# Patient Record
Sex: FEMALE | Race: WHITE | NOT HISPANIC OR LATINO | URBAN - METROPOLITAN AREA
[De-identification: names, ages, dates, MRNs, and addresses within clinical notes are randomized per-mention and may not be internally consistent; named-entity substitution may affect disease eponyms.]

---

## 2017-06-13 ENCOUNTER — OUTPATIENT (OUTPATIENT)
Dept: OUTPATIENT SERVICES | Facility: HOSPITAL | Age: 62
LOS: 1 days | End: 2017-06-13
Payer: COMMERCIAL

## 2017-06-13 VITALS
TEMPERATURE: 98 F | OXYGEN SATURATION: 98 % | DIASTOLIC BLOOD PRESSURE: 80 MMHG | SYSTOLIC BLOOD PRESSURE: 122 MMHG | HEART RATE: 60 BPM | HEIGHT: 65 IN | RESPIRATION RATE: 14 BRPM | WEIGHT: 174.17 LBS

## 2017-06-13 DIAGNOSIS — Z01.818 ENCOUNTER FOR OTHER PREPROCEDURAL EXAMINATION: ICD-10-CM

## 2017-06-13 DIAGNOSIS — Z98.890 OTHER SPECIFIED POSTPROCEDURAL STATES: Chronic | ICD-10-CM

## 2017-06-13 DIAGNOSIS — E89.0 POSTPROCEDURAL HYPOTHYROIDISM: Chronic | ICD-10-CM

## 2017-06-13 DIAGNOSIS — M19.90 UNSPECIFIED OSTEOARTHRITIS, UNSPECIFIED SITE: ICD-10-CM

## 2017-06-13 DIAGNOSIS — M16.10 UNILATERAL PRIMARY OSTEOARTHRITIS, UNSPECIFIED HIP: ICD-10-CM

## 2017-06-13 LAB
ALBUMIN SERPL ELPH-MCNC: 4.2 G/DL — SIGNIFICANT CHANGE UP (ref 3.3–5)
ALP SERPL-CCNC: 103 U/L — SIGNIFICANT CHANGE UP (ref 30–120)
ALT FLD-CCNC: 28 U/L DA — SIGNIFICANT CHANGE UP (ref 10–60)
ANION GAP SERPL CALC-SCNC: 5 MMOL/L — SIGNIFICANT CHANGE UP (ref 5–17)
APTT BLD: 30.9 SEC — SIGNIFICANT CHANGE UP (ref 27.5–37.4)
AST SERPL-CCNC: 23 U/L — SIGNIFICANT CHANGE UP (ref 10–40)
BILIRUB SERPL-MCNC: 0.5 MG/DL — SIGNIFICANT CHANGE UP (ref 0.2–1.2)
BLD GP AB SCN SERPL QL: SIGNIFICANT CHANGE UP
BUN SERPL-MCNC: 16 MG/DL — SIGNIFICANT CHANGE UP (ref 7–23)
CALCIUM SERPL-MCNC: 9.2 MG/DL — SIGNIFICANT CHANGE UP (ref 8.4–10.5)
CHLORIDE SERPL-SCNC: 105 MMOL/L — SIGNIFICANT CHANGE UP (ref 96–108)
CO2 SERPL-SCNC: 31 MMOL/L — SIGNIFICANT CHANGE UP (ref 22–31)
CREAT SERPL-MCNC: 0.6 MG/DL — SIGNIFICANT CHANGE UP (ref 0.5–1.3)
GLUCOSE SERPL-MCNC: 112 MG/DL — HIGH (ref 70–99)
HCT VFR BLD CALC: 43.5 % — SIGNIFICANT CHANGE UP (ref 34.5–45)
HGB BLD-MCNC: 14.1 G/DL — SIGNIFICANT CHANGE UP (ref 11.5–15.5)
INR BLD: 0.99 RATIO — SIGNIFICANT CHANGE UP (ref 0.88–1.16)
MCHC RBC-ENTMCNC: 28.2 PG — SIGNIFICANT CHANGE UP (ref 27–34)
MCHC RBC-ENTMCNC: 32.5 GM/DL — SIGNIFICANT CHANGE UP (ref 32–36)
MCV RBC AUTO: 86.7 FL — SIGNIFICANT CHANGE UP (ref 80–100)
MRSA PCR RESULT.: SIGNIFICANT CHANGE UP
PLATELET # BLD AUTO: 239 K/UL — SIGNIFICANT CHANGE UP (ref 150–400)
POTASSIUM SERPL-MCNC: 4.3 MMOL/L — SIGNIFICANT CHANGE UP (ref 3.5–5.3)
POTASSIUM SERPL-SCNC: 4.3 MMOL/L — SIGNIFICANT CHANGE UP (ref 3.5–5.3)
PROT SERPL-MCNC: 7.7 G/DL — SIGNIFICANT CHANGE UP (ref 6–8.3)
PROTHROM AB SERPL-ACNC: 10.8 SEC — SIGNIFICANT CHANGE UP (ref 9.8–12.7)
RBC # BLD: 5.02 M/UL — SIGNIFICANT CHANGE UP (ref 3.8–5.2)
RBC # FLD: 12.2 % — SIGNIFICANT CHANGE UP (ref 10.3–14.5)
S AUREUS DNA NOSE QL NAA+PROBE: DETECTED
SODIUM SERPL-SCNC: 141 MMOL/L — SIGNIFICANT CHANGE UP (ref 135–145)
WBC # BLD: 7.9 K/UL — SIGNIFICANT CHANGE UP (ref 3.8–10.5)
WBC # FLD AUTO: 7.9 K/UL — SIGNIFICANT CHANGE UP (ref 3.8–10.5)

## 2017-06-13 PROCEDURE — 93010 ELECTROCARDIOGRAM REPORT: CPT | Mod: NC

## 2017-06-13 NOTE — H&P PST ADULT - FAMILY HISTORY
Mother  Still living? No  Family history of lung cancer, Age at diagnosis: Age Unknown     Father  Still living? No  Family history of prostate cancer, Age at diagnosis: Age Unknown     Child  Still living? Yes, Estimated age: 21-30  Family history of type 1 diabetes mellitus, Age at diagnosis: Age Unknown

## 2017-06-13 NOTE — H&P PST ADULT - PSYCHIATRIC
negative Affect and characteristics of appearance, verbalizations, behaviors are appropriate not examined

## 2017-06-13 NOTE — H&P PST ADULT - NSANTHOSAYNRD_GEN_A_CORE
No. GIANNA screening performed.  STOP BANG Legend: 0-2 = LOW Risk; 3-4 = INTERMEDIATE Risk; 5-8 = HIGH Risk

## 2017-06-13 NOTE — H&P PST ADULT - PSH
H/O lithotripsy  1997  H/O varicose vein stripping  2012  S/P LASIK surgery  2004  S/P ovarian cystectomy  right ovary 1989  S/P thyroidectomy  partial Right ?  Status post cystoscopy  h/o bladder cancer at age  26 s/p TURBT

## 2017-06-13 NOTE — H&P PST ADULT - HISTORY OF PRESENT ILLNESS
This is a 62 y/o female with right hip pain with radiation to groin and thigh for the past 2 years . she has been experiencing constant achy pain and worse pain 8/10 when getting up from sitting position . scheduled for right anterior total hip replacement This is a 62 y/o female who presents with right hip pain with radiation to groin and thigh for the past 2 years . she has been experiencing constant achy pain and worse pain 8/10 when getting up from sitting position . scheduled for right anterior total hip replacement

## 2017-06-13 NOTE — H&P PST ADULT - PMH
Bladder cancer  at age 26 , surgical intervention  Hypothyroidism  s/p right partial thyroidectomy  Osteoarthritis    Stress incontinence in female

## 2017-06-14 RX ORDER — MUPIROCIN 20 MG/G
1 OINTMENT TOPICAL
Qty: 1 | Refills: 0 | OUTPATIENT
Start: 2017-06-14 | End: 2017-06-19

## 2017-06-14 NOTE — PROGRESS NOTE ADULT - SUBJECTIVE AND OBJECTIVE BOX
Nasal PCR result" staph aureus detected  Topical Mupirocin escribed  Left message for patient to call back to verify treatment Nasal PCR result" staph aureus detected  Topical Mupirocin escribed  Left message for patient to call back to verify treatment  *addendum: patient called back to verify treatment

## 2017-06-19 NOTE — PROVIDER CONTACT NOTE (OTHER) - ACTION/TREATMENT ORDERED:
spoke to pt. who states using Mupirocin. Verbalized understanding to use 2x/day for a total of 5 days. Reminded to bring checklist to hospital on day of surgery

## 2017-06-21 PROCEDURE — 86900 BLOOD TYPING SEROLOGIC ABO: CPT

## 2017-06-21 PROCEDURE — 87641 MR-STAPH DNA AMP PROBE: CPT

## 2017-06-21 PROCEDURE — 36415 COLL VENOUS BLD VENIPUNCTURE: CPT

## 2017-06-21 PROCEDURE — 93005 ELECTROCARDIOGRAM TRACING: CPT

## 2017-06-21 PROCEDURE — 87640 STAPH A DNA AMP PROBE: CPT

## 2017-06-21 PROCEDURE — 85730 THROMBOPLASTIN TIME PARTIAL: CPT

## 2017-06-21 PROCEDURE — 86901 BLOOD TYPING SEROLOGIC RH(D): CPT

## 2017-06-21 PROCEDURE — 86920 COMPATIBILITY TEST SPIN: CPT

## 2017-06-21 PROCEDURE — 85610 PROTHROMBIN TIME: CPT

## 2017-06-21 PROCEDURE — 86850 RBC ANTIBODY SCREEN: CPT

## 2017-06-21 PROCEDURE — 80053 COMPREHEN METABOLIC PANEL: CPT

## 2017-06-21 PROCEDURE — 85027 COMPLETE CBC AUTOMATED: CPT

## 2017-06-21 PROCEDURE — G0463: CPT

## 2017-06-21 RX ORDER — SENNA PLUS 8.6 MG/1
2 TABLET ORAL AT BEDTIME
Qty: 0 | Refills: 0 | Status: DISCONTINUED | OUTPATIENT
Start: 2017-06-22 | End: 2017-06-25

## 2017-06-21 RX ORDER — PANTOPRAZOLE SODIUM 20 MG/1
40 TABLET, DELAYED RELEASE ORAL
Qty: 0 | Refills: 0 | Status: DISCONTINUED | OUTPATIENT
Start: 2017-06-22 | End: 2017-06-25

## 2017-06-21 RX ORDER — MAGNESIUM HYDROXIDE 400 MG/1
30 TABLET, CHEWABLE ORAL DAILY
Qty: 0 | Refills: 0 | Status: DISCONTINUED | OUTPATIENT
Start: 2017-06-22 | End: 2017-06-25

## 2017-06-21 RX ORDER — ONDANSETRON 8 MG/1
4 TABLET, FILM COATED ORAL EVERY 6 HOURS
Qty: 0 | Refills: 0 | Status: DISCONTINUED | OUTPATIENT
Start: 2017-06-22 | End: 2017-06-25

## 2017-06-21 RX ORDER — DOCUSATE SODIUM 100 MG
100 CAPSULE ORAL THREE TIMES A DAY
Qty: 0 | Refills: 0 | Status: DISCONTINUED | OUTPATIENT
Start: 2017-06-22 | End: 2017-06-25

## 2017-06-21 RX ORDER — POLYETHYLENE GLYCOL 3350 17 G/17G
17 POWDER, FOR SOLUTION ORAL DAILY
Qty: 0 | Refills: 0 | Status: DISCONTINUED | OUTPATIENT
Start: 2017-06-22 | End: 2017-06-25

## 2017-06-21 RX ORDER — SODIUM CHLORIDE 9 MG/ML
1000 INJECTION, SOLUTION INTRAVENOUS
Qty: 0 | Refills: 0 | Status: DISCONTINUED | OUTPATIENT
Start: 2017-06-22 | End: 2017-06-23

## 2017-06-22 ENCOUNTER — TRANSCRIPTION ENCOUNTER (OUTPATIENT)
Age: 62
End: 2017-06-22

## 2017-06-22 ENCOUNTER — INPATIENT (INPATIENT)
Facility: HOSPITAL | Age: 62
LOS: 2 days | Discharge: HOME | DRG: 470 | End: 2017-06-25
Attending: ORTHOPAEDIC SURGERY | Admitting: ORTHOPAEDIC SURGERY
Payer: COMMERCIAL

## 2017-06-22 VITALS
HEART RATE: 58 BPM | OXYGEN SATURATION: 96 % | DIASTOLIC BLOOD PRESSURE: 64 MMHG | WEIGHT: 176.37 LBS | RESPIRATION RATE: 17 BRPM | SYSTOLIC BLOOD PRESSURE: 114 MMHG | TEMPERATURE: 98 F | HEIGHT: 65.5 IN

## 2017-06-22 DIAGNOSIS — E89.0 POSTPROCEDURAL HYPOTHYROIDISM: Chronic | ICD-10-CM

## 2017-06-22 DIAGNOSIS — Z98.890 OTHER SPECIFIED POSTPROCEDURAL STATES: Chronic | ICD-10-CM

## 2017-06-22 DIAGNOSIS — M19.90 UNSPECIFIED OSTEOARTHRITIS, UNSPECIFIED SITE: ICD-10-CM

## 2017-06-22 DIAGNOSIS — M16.10 UNILATERAL PRIMARY OSTEOARTHRITIS, UNSPECIFIED HIP: ICD-10-CM

## 2017-06-22 DIAGNOSIS — F10.10 ALCOHOL ABUSE, UNCOMPLICATED: ICD-10-CM

## 2017-06-22 DIAGNOSIS — N39.3 STRESS INCONTINENCE (FEMALE) (MALE): ICD-10-CM

## 2017-06-22 DIAGNOSIS — Z47.1 AFTERCARE FOLLOWING JOINT REPLACEMENT SURGERY: ICD-10-CM

## 2017-06-22 DIAGNOSIS — E78.5 HYPERLIPIDEMIA, UNSPECIFIED: ICD-10-CM

## 2017-06-22 DIAGNOSIS — E03.9 HYPOTHYROIDISM, UNSPECIFIED: ICD-10-CM

## 2017-06-22 DIAGNOSIS — C67.9 MALIGNANT NEOPLASM OF BLADDER, UNSPECIFIED: ICD-10-CM

## 2017-06-22 DIAGNOSIS — M16.11 UNILATERAL PRIMARY OSTEOARTHRITIS, RIGHT HIP: ICD-10-CM

## 2017-06-22 LAB
ANION GAP SERPL CALC-SCNC: 7 MMOL/L — SIGNIFICANT CHANGE UP (ref 5–17)
BUN SERPL-MCNC: 12 MG/DL — SIGNIFICANT CHANGE UP (ref 7–23)
CALCIUM SERPL-MCNC: 8.4 MG/DL — SIGNIFICANT CHANGE UP (ref 8.4–10.5)
CHLORIDE SERPL-SCNC: 108 MMOL/L — SIGNIFICANT CHANGE UP (ref 96–108)
CO2 SERPL-SCNC: 28 MMOL/L — SIGNIFICANT CHANGE UP (ref 22–31)
CREAT SERPL-MCNC: 0.51 MG/DL — SIGNIFICANT CHANGE UP (ref 0.5–1.3)
GLUCOSE SERPL-MCNC: 146 MG/DL — HIGH (ref 70–99)
HCT VFR BLD CALC: 34 % — LOW (ref 34.5–45)
HGB BLD-MCNC: 11.7 G/DL — SIGNIFICANT CHANGE UP (ref 11.5–15.5)
POTASSIUM SERPL-MCNC: 3.7 MMOL/L — SIGNIFICANT CHANGE UP (ref 3.5–5.3)
POTASSIUM SERPL-SCNC: 3.7 MMOL/L — SIGNIFICANT CHANGE UP (ref 3.5–5.3)
SODIUM SERPL-SCNC: 143 MMOL/L — SIGNIFICANT CHANGE UP (ref 135–145)

## 2017-06-22 PROCEDURE — 88305 TISSUE EXAM BY PATHOLOGIST: CPT | Mod: 26

## 2017-06-22 PROCEDURE — 27130 TOTAL HIP ARTHROPLASTY: CPT | Mod: AS,RT

## 2017-06-22 PROCEDURE — 99223 1ST HOSP IP/OBS HIGH 75: CPT | Mod: 25

## 2017-06-22 PROCEDURE — 88311 DECALCIFY TISSUE: CPT | Mod: 26

## 2017-06-22 RX ORDER — VANCOMYCIN HCL 1 G
1000 VIAL (EA) INTRAVENOUS ONCE
Qty: 0 | Refills: 0 | Status: COMPLETED | OUTPATIENT
Start: 2017-06-22 | End: 2017-06-22

## 2017-06-22 RX ORDER — TRANEXAMIC ACID 100 MG/ML
1000 INJECTION, SOLUTION INTRAVENOUS ONCE
Qty: 0 | Refills: 0 | Status: COMPLETED | OUTPATIENT
Start: 2017-06-22 | End: 2017-06-22

## 2017-06-22 RX ORDER — APREPITANT 80 MG/1
40 CAPSULE ORAL ONCE
Qty: 0 | Refills: 0 | Status: COMPLETED | OUTPATIENT
Start: 2017-06-22 | End: 2017-06-22

## 2017-06-22 RX ORDER — CELECOXIB 200 MG/1
1 CAPSULE ORAL
Qty: 38 | Refills: 0 | OUTPATIENT
Start: 2017-06-22 | End: 2017-07-11

## 2017-06-22 RX ORDER — LEVOTHYROXINE SODIUM 125 MCG
100 TABLET ORAL DAILY
Qty: 0 | Refills: 0 | Status: DISCONTINUED | OUTPATIENT
Start: 2017-06-23 | End: 2017-06-25

## 2017-06-22 RX ORDER — ACETAMINOPHEN 500 MG
1000 TABLET ORAL EVERY 6 HOURS
Qty: 0 | Refills: 0 | Status: COMPLETED | OUTPATIENT
Start: 2017-06-22 | End: 2017-06-23

## 2017-06-22 RX ORDER — OMEPRAZOLE 10 MG/1
1 CAPSULE, DELAYED RELEASE ORAL
Qty: 0 | Refills: 0 | COMMUNITY

## 2017-06-22 RX ORDER — ACETAMINOPHEN 500 MG
1000 TABLET ORAL EVERY 8 HOURS
Qty: 0 | Refills: 0 | Status: DISCONTINUED | OUTPATIENT
Start: 2017-06-23 | End: 2017-06-25

## 2017-06-22 RX ORDER — ATORVASTATIN CALCIUM 80 MG/1
40 TABLET, FILM COATED ORAL DAILY
Qty: 0 | Refills: 0 | Status: DISCONTINUED | OUTPATIENT
Start: 2017-06-23 | End: 2017-06-25

## 2017-06-22 RX ORDER — OXYBUTYNIN CHLORIDE 5 MG
5 TABLET ORAL
Qty: 0 | Refills: 0 | Status: DISCONTINUED | OUTPATIENT
Start: 2017-06-22 | End: 2017-06-25

## 2017-06-22 RX ORDER — OXYCODONE HYDROCHLORIDE 5 MG/1
10 TABLET ORAL
Qty: 0 | Refills: 0 | Status: DISCONTINUED | OUTPATIENT
Start: 2017-06-22 | End: 2017-06-23

## 2017-06-22 RX ORDER — ACETAMINOPHEN 500 MG
1000 TABLET ORAL ONCE
Qty: 0 | Refills: 0 | Status: COMPLETED | OUTPATIENT
Start: 2017-06-22 | End: 2017-06-22

## 2017-06-22 RX ORDER — OXYCODONE HYDROCHLORIDE 5 MG/1
5 TABLET ORAL
Qty: 0 | Refills: 0 | Status: DISCONTINUED | OUTPATIENT
Start: 2017-06-22 | End: 2017-06-23

## 2017-06-22 RX ORDER — CELECOXIB 200 MG/1
200 CAPSULE ORAL ONCE
Qty: 0 | Refills: 0 | Status: COMPLETED | OUTPATIENT
Start: 2017-06-22 | End: 2017-06-22

## 2017-06-22 RX ORDER — ASPIRIN/CALCIUM CARB/MAGNESIUM 324 MG
162 TABLET ORAL EVERY 12 HOURS
Qty: 0 | Refills: 0 | Status: DISCONTINUED | OUTPATIENT
Start: 2017-06-23 | End: 2017-06-25

## 2017-06-22 RX ORDER — HYDROMORPHONE HYDROCHLORIDE 2 MG/ML
0.5 INJECTION INTRAMUSCULAR; INTRAVENOUS; SUBCUTANEOUS
Qty: 0 | Refills: 0 | Status: DISCONTINUED | OUTPATIENT
Start: 2017-06-22 | End: 2017-06-23

## 2017-06-22 RX ORDER — HYDROMORPHONE HYDROCHLORIDE 2 MG/ML
0.5 INJECTION INTRAMUSCULAR; INTRAVENOUS; SUBCUTANEOUS
Qty: 0 | Refills: 0 | Status: DISCONTINUED | OUTPATIENT
Start: 2017-06-22 | End: 2017-06-22

## 2017-06-22 RX ORDER — SODIUM CHLORIDE 9 MG/ML
1000 INJECTION, SOLUTION INTRAVENOUS
Qty: 0 | Refills: 0 | Status: DISCONTINUED | OUTPATIENT
Start: 2017-06-22 | End: 2017-06-22

## 2017-06-22 RX ORDER — VANCOMYCIN HCL 1 G
1250 VIAL (EA) INTRAVENOUS ONCE
Qty: 0 | Refills: 0 | Status: COMPLETED | OUTPATIENT
Start: 2017-06-22 | End: 2017-06-22

## 2017-06-22 RX ORDER — CELECOXIB 200 MG/1
200 CAPSULE ORAL
Qty: 0 | Refills: 0 | Status: DISCONTINUED | OUTPATIENT
Start: 2017-06-22 | End: 2017-06-25

## 2017-06-22 RX ADMIN — Medication 166.67 MILLIGRAM(S): at 19:49

## 2017-06-22 RX ADMIN — Medication 400 MILLIGRAM(S): at 16:19

## 2017-06-22 RX ADMIN — APREPITANT 40 MILLIGRAM(S): 80 CAPSULE ORAL at 07:55

## 2017-06-22 RX ADMIN — Medication 1000 MILLIGRAM(S): at 22:00

## 2017-06-22 RX ADMIN — CELECOXIB 200 MILLIGRAM(S): 200 CAPSULE ORAL at 07:56

## 2017-06-22 RX ADMIN — HYDROMORPHONE HYDROCHLORIDE 0.5 MILLIGRAM(S): 2 INJECTION INTRAMUSCULAR; INTRAVENOUS; SUBCUTANEOUS at 21:36

## 2017-06-22 RX ADMIN — SODIUM CHLORIDE 100 MILLILITER(S): 9 INJECTION, SOLUTION INTRAVENOUS at 13:21

## 2017-06-22 RX ADMIN — Medication 1000 MILLIGRAM(S): at 18:25

## 2017-06-22 RX ADMIN — HYDROMORPHONE HYDROCHLORIDE 0.5 MILLIGRAM(S): 2 INJECTION INTRAMUSCULAR; INTRAVENOUS; SUBCUTANEOUS at 21:45

## 2017-06-22 RX ADMIN — Medication 200 MILLIGRAM(S): at 18:19

## 2017-06-22 RX ADMIN — HYDROMORPHONE HYDROCHLORIDE 0.5 MILLIGRAM(S): 2 INJECTION INTRAMUSCULAR; INTRAVENOUS; SUBCUTANEOUS at 16:46

## 2017-06-22 RX ADMIN — HYDROMORPHONE HYDROCHLORIDE 0.5 MILLIGRAM(S): 2 INJECTION INTRAMUSCULAR; INTRAVENOUS; SUBCUTANEOUS at 13:39

## 2017-06-22 RX ADMIN — Medication 100 MILLIGRAM(S): at 21:26

## 2017-06-22 RX ADMIN — SENNA PLUS 2 TABLET(S): 8.6 TABLET ORAL at 21:26

## 2017-06-22 RX ADMIN — ONDANSETRON 4 MILLIGRAM(S): 8 TABLET, FILM COATED ORAL at 13:21

## 2017-06-22 RX ADMIN — Medication 400 MILLIGRAM(S): at 21:26

## 2017-06-22 RX ADMIN — HYDROMORPHONE HYDROCHLORIDE 0.5 MILLIGRAM(S): 2 INJECTION INTRAMUSCULAR; INTRAVENOUS; SUBCUTANEOUS at 13:21

## 2017-06-22 RX ADMIN — HYDROMORPHONE HYDROCHLORIDE 0.5 MILLIGRAM(S): 2 INJECTION INTRAMUSCULAR; INTRAVENOUS; SUBCUTANEOUS at 16:29

## 2017-06-22 NOTE — CONSULT NOTE ADULT - SUBJECTIVE AND OBJECTIVE BOX
Patient is a 61y old  Female who presents with a chief complaint of Right hip pain (2017 12:20)      HPI:    PAST MEDICAL & SURGICAL HISTORY:  Stress incontinence in female  Hypothyroidism: s/p right partial thyroidectomy  Osteoarthritis  Bladder cancer: at age 26 , surgical intervention  S/P ovarian cystectomy: right ovary   Status post cystoscopy: h/o bladder cancer at age  26 s/p TURBT  S/P LASIK surgery:   H/O varicose vein strippin  H/O lithotripsy:   S/P thyroidectomy: partial Right ?      REVIEW OF SYSTEMS:    CONSTITUTIONAL: No fever, weight loss, or fatigue  EYES: No eye pain, visual disturbances, or discharge  ENMT:  No difficulty hearing, tinnitus, vertigo; No sinus or throat pain  NECK: No pain or stiffness  BREASTS: No pain, masses, or nipple discharge  RESPIRATORY: No cough, wheezing, chills or hemoptysis; No shortness of breath  CARDIOVASCULAR: No chest pain, palpitations, dizziness, or leg swelling  GASTROINTESTINAL: No abdominal or epigastric pain. No nausea, vomiting, or hematemesis; No diarrhea or constipation. No melena or hematochezia.  GENITOURINARY: No dysuria, frequency, hematuria, or incontinence  NEUROLOGICAL: No headaches, memory loss, loss of strength, numbness, or tremors  SKIN: No itching, burning, rashes, or lesions   LYMPH NODES: No enlarged glands  ENDOCRINE: No heat or cold intolerance; No hair loss  MUSCULOSKELETAL: No muscle or back pain  PSYCHIATRIC: No depression, anxiety, mood swings, or difficulty sleeping  HEME/LYMPH: No easy bruising, or bleeding gums  ALLERGY AND IMMUNOLOGIC: No hives or eczema      MEDICATIONS  (STANDING):  lactated ringers. 1000milliLiter(s) IV Continuous <Continuous>  pantoprazole    Tablet 40milliGRAM(s) Oral before breakfast  senna 2Tablet(s) Oral at bedtime  docusate sodium 100milliGRAM(s) Oral three times a day  vancomycin  IVPB 1250milliGRAM(s) IV Intermittent once  celecoxib 200milliGRAM(s) Oral two times a day after meals  oxybutynin 5milliGRAM(s) Oral two times a day  acetaminophen  IVPB. 1000milliGRAM(s) IV Intermittent every 6 hours    MEDICATIONS  (PRN):  aluminum hydroxide/magnesium hydroxide/simethicone Suspension 30milliLiter(s) Oral four times a day PRN Indigestion  ondansetron Injectable 4milliGRAM(s) IV Push every 6 hours PRN Nausea and/or Vomiting  polyethylene glycol 3350 17Gram(s) Oral daily PRN Constipation  magnesium hydroxide Suspension 30milliLiter(s) Oral daily PRN Constipation  oxyCODONE IR 5milliGRAM(s) Oral every 3 hours PRN Mild Pain  oxyCODONE IR 10milliGRAM(s) Oral every 3 hours PRN Moderate Pain  HYDROmorphone  Injectable 0.5milliGRAM(s) IV Push every 3 hours PRN Severe Pain      Allergies    bacitracin topical (Other)  penicillins (Other)    Intolerances        SOCIAL HISTORY:  Alochol: Drinks wine 1 glass daily  Smoking: Former smoker  Drug Use: Denied  Marital Status:           FAMILY HISTORY:  Family history of type 1 diabetes mellitus (Child): son  Family history of prostate cancer (Father)  Family history of lung cancer (Mother)      Vital Signs Last 24 Hrs  T(C): 36.3, Max: 36.6 ( @ 07:33)  T(F): 97.3, Max: 97.9 ( @ 07:33)  HR: 64 (58 - 71)  BP: 121/66 (103/62 - 121/66)  BP(mean): --  RR: 16 (14 - 19)  SpO2: 100% (96% - 100%)    PHYSICAL EXAM:    GENERAL: NAD, well-groomed, well-developed  HEAD:  Atraumatic, Normocephalic  EYES: EOMI, PERRLA, conjunctiva and sclera clear  ENMT: No tonsillar erythema, exudates, or enlargement; Moist mucous membranes, Good dentition, No lesions  NECK: Supple, No JVD, Normal thyroid  NERVOUS SYSTEM:  Alert & Oriented X3, Good concentration; Motor Strength 5/5 B/L upper and lower extremities; DTRs 2+ intact and symmetric  CHEST/LUNG: Clear to percussion bilaterally; No rales, rhonchi, wheezing, or rubs  HEART: Regular rate and rhythm; No murmurs, rubs, or gallops  ABDOMEN: Soft, Nontender, Nondistended; Bowel sounds present  EXTREMITIES:  2+ Peripheral Pulses, No clubbing, cyanosis, or edema  LYMPH: No lymphadenopathy notedRECTAL: No masses, prostate normal size and smooth, Guiac negative   BREAST: No palpatble masses, skin no lesions no retractions, no discharages. adenexa no palpable masses noted   GYN: uterus normal size, adenexa no palpable masses, no CMT, no uterine discharge   SKIN: No rashes or lesions  INCISION:     LABS:              CAPILLARY BLOOD GLUCOSE      RADIOLOGY & ADDITIONAL STUDIES: Patient is a 61y old  Female who presents with a chief complaint of Right hip pain (2017 12:20)      HPI: patient has right hip pain for a while and not responding to medical management and affected her daily activity.     PAST MEDICAL & SURGICAL HISTORY:  Stress incontinence in female  Hypothyroidism: s/p right partial thyroidectomy  Osteoarthritis  Bladder cancer: at age 26 , surgical intervention  S/P ovarian cystectomy: right ovary   Status post cystoscopy: h/o bladder cancer at age  26 s/p TURBT  S/P LASIK surgery:   H/O varicose vein strippin  H/O lithotripsy:   S/P thyroidectomy: partial Right ?      REVIEW OF SYSTEMS:    CONSTITUTIONAL: No fever, weight loss, or fatigue  EYES: No eye pain, visual disturbances, or discharge  ENMT:  No difficulty hearing, tinnitus, vertigo; No sinus or throat pain  NECK: No pain or stiffness  BREASTS: No pain, masses, or nipple discharge  RESPIRATORY: No cough, wheezing, chills or hemoptysis; No shortness of breath  CARDIOVASCULAR: No chest pain, palpitations, dizziness, or leg swelling  GASTROINTESTINAL: No abdominal or epigastric pain. No nausea, vomiting, or hematemesis; No diarrhea or constipation. No melena or hematochezia.  GENITOURINARY: No dysuria, frequency, hematuria, or incontinence  NEUROLOGICAL: No headaches, memory loss, loss of strength, numbness, or tremors  SKIN: No itching, burning, rashes, or lesions   LYMPH NODES: No enlarged glands  ENDOCRINE: No heat or cold intolerance; No hair loss  MUSCULOSKELETAL: No muscle or back pain  PSYCHIATRIC: No depression, anxiety, mood swings, or difficulty sleeping  HEME/LYMPH: No easy bruising, or bleeding gums  ALLERGY AND IMMUNOLOGIC: No hives or eczema      MEDICATIONS  (STANDING):  lactated ringers. 1000milliLiter(s) IV Continuous <Continuous>  pantoprazole    Tablet 40milliGRAM(s) Oral before breakfast  senna 2Tablet(s) Oral at bedtime  docusate sodium 100milliGRAM(s) Oral three times a day  vancomycin  IVPB 1250milliGRAM(s) IV Intermittent once  celecoxib 200milliGRAM(s) Oral two times a day after meals  oxybutynin 5milliGRAM(s) Oral two times a day  acetaminophen  IVPB. 1000milliGRAM(s) IV Intermittent every 6 hours    MEDICATIONS  (PRN):  aluminum hydroxide/magnesium hydroxide/simethicone Suspension 30milliLiter(s) Oral four times a day PRN Indigestion  ondansetron Injectable 4milliGRAM(s) IV Push every 6 hours PRN Nausea and/or Vomiting  polyethylene glycol 3350 17Gram(s) Oral daily PRN Constipation  magnesium hydroxide Suspension 30milliLiter(s) Oral daily PRN Constipation  oxyCODONE IR 5milliGRAM(s) Oral every 3 hours PRN Mild Pain  oxyCODONE IR 10milliGRAM(s) Oral every 3 hours PRN Moderate Pain  HYDROmorphone  Injectable 0.5milliGRAM(s) IV Push every 3 hours PRN Severe Pain      Allergies    bacitracin topical (Other)  penicillins (Other)    Intolerances        SOCIAL HISTORY:  Alochol: Drinks wine socially.  Smoking: Former smoker years ago.  Drug Use: Denied  Marital Status:           FAMILY HISTORY:  Family history of type 1 diabetes mellitus (Child): son  Family history of prostate cancer (Father)  Family history of lung cancer (Mother)      Vital Signs Last 24 Hrs  T(C): 36.3, Max: 36.6 (06- @ 07:33)  T(F): 97.3, Max: 97.9 (06-22 @ 07:33)  HR: 64 (58 - 71)  BP: 121/66 (103/62 - 121/66)  BP(mean): --  RR: 16 (14 - 19)  SpO2: 100% (96% - 100%)    PHYSICAL EXAM:    GENERAL: NAD, well-groomed, well-developed  HEAD:  Atraumatic, Normocephalic  EYES: EOMI, PERRLA, conjunctiva and sclera clear  ENMT: No tonsillar erythema, exudates, or enlargement; Moist mucous membranes, Good dentition, No lesions  NECK: Supple, No JVD, Normal thyroid  NERVOUS SYSTEM:  Alert & Oriented X3, Good concentration; Motor Strength 5/5 B/L upper and lower extremities; DTRs 2+ intact and symmetric  CHEST/LUNG: Clear to percussion bilaterally; No rales, rhonchi, wheezing, or rubs  HEART: Regular rate and rhythm; No murmurs, rubs, or gallops  ABDOMEN: Soft, Nontender, Nondistended; Bowel sounds present  EXTREMITIES:  2+ Peripheral Pulses, No clubbing, cyanosis, or edema  LYMPH: No lymphadenopathy notedRECTAL: No masses, prostate normal size and smooth, Guiac negative   SKIN: No rashes or lesions  INCISION:     LABS: pending              CAPILLARY BLOOD GLUCOSE      RADIOLOGY & ADDITIONAL STUDIES:

## 2017-06-22 NOTE — PHYSICAL THERAPY INITIAL EVALUATION ADULT - GAIT DEVIATIONS NOTED, PT EVAL
decreased step length/decreased stride length/decreased velocity of limb motion/decreased weight-shifting ability/decreased og

## 2017-06-22 NOTE — DISCHARGE NOTE ADULT - CARE PLAN
Principal Discharge DX:	Primary osteoarthritis of right hip  Goal:	Improve ambulation, ADLs and quality of life  Instructions for follow-up, activity and diet:	Physical Therapy/Occupational Therapy for: Ambulation, transfers, stairs, & ADLs, isometrics.  Full weight bearing on both legs; Walker/cane use as instructed by Physical therapy/Occupational therapy. Anterior Total Hip Replacement precautions for  6 weeks: No straight leg raise; No external rotation of hip when extended-standing or lying flat; No hyperextension of hip when standing (kickback).   Ice packs to hip for 30 min. every 3 hours and after physical therapy.   Keep incision clean and dry. May shower 5 days after surgery if no drainage from incision.  Prineo tape/suture removal on/near after Post Op Day # 14 in rehab facility / Surgeon's office.

## 2017-06-22 NOTE — OCCUPATIONAL THERAPY INITIAL EVALUATION ADULT - IADL RETRAINING, OT EVAL
Patient will increase standing tolerance to 3-5 minutes for grooming at the sink with in 1-2 sessions.

## 2017-06-22 NOTE — PHYSICAL THERAPY INITIAL EVALUATION ADULT - GAIT TRAINING, PT EVAL
Goals 3-5 days, Pt will ambulate 75 ft w/ rolling walker independently.      Pt will negotiate 10 steps with rail and straight cane with supervision.

## 2017-06-22 NOTE — DISCHARGE NOTE ADULT - CARE PROVIDER_API CALL
JOSEMANUEL Alaniz (MD), Orthopaedic Surgery  [address] [place] [address]  Phone: [phone]  Fax: [phone]

## 2017-06-22 NOTE — DISCHARGE NOTE ADULT - DURABLE MEDICAL EQUIPMENT AGENCY
[facility] Tel.# [phone] for DME for Rolling Walker, 3:1 [employer] [facility] Tel.# [phone] for DME for Rolling Walker, 3:1 [employer] / Hip Kit thru [employer] (out of pocket)

## 2017-06-22 NOTE — PHYSICAL THERAPY INITIAL EVALUATION ADULT - ADDITIONAL COMMENTS
Pt lives with  in a house w/ 4 steps to enter with no rail, 20 steps insides to bedroom with rail.  Pt has no DME

## 2017-06-22 NOTE — DISCHARGE NOTE ADULT - HOSPITAL COURSE
This patient was admitted to [facility] with a history of severe degenerative joint disease of the Right hip.  Patient went to Pre-Surgical Testing at [facility] and was medically cleared to undergo elective procedure. Right THR performed on 6/22/17 by .  No operative or shahriar-operative complications arose during patients hospital course.  Patient received antibiotic according to SCIP guidelines for infection prevention.  Enotrin was given for DVT prophylaxis.  Anesthesia, Medical Hospitalist, Physical Therapy and Occupational Therapy were consulted. Patient is stable for discharge with a good prognosis.  Appropriate discharge instructions and medications are provided in this document.

## 2017-06-22 NOTE — OCCUPATIONAL THERAPY INITIAL EVALUATION ADULT - ADDITIONAL COMMENTS
Lives with spouse. 4 steps to enter no handrail. Flight of 20 inside with handrail. Tub with curtains. No DME

## 2017-06-22 NOTE — DISCHARGE NOTE ADULT - PLAN OF CARE
Improve ambulation, ADLs and quality of life Physical Therapy/Occupational Therapy for: Ambulation, transfers, stairs, & ADLs, isometrics.  Full weight bearing on both legs; Walker/cane use as instructed by Physical therapy/Occupational therapy. Anterior Total Hip Replacement precautions for  6 weeks: No straight leg raise; No external rotation of hip when extended-standing or lying flat; No hyperextension of hip when standing (kickback).   Ice packs to hip for 30 min. every 3 hours and after physical therapy.   Keep incision clean and dry. May shower 5 days after surgery if no drainage from incision.  Prineo tape/suture removal on/near after Post Op Day # 14 in rehab facility / Surgeon's office.

## 2017-06-22 NOTE — DISCHARGE NOTE ADULT - PATIENT PORTAL LINK FT
“You can access the [employer] Patient Portal, offered by [facility], by registering with the following website: [email or web address]”

## 2017-06-22 NOTE — DISCHARGE NOTE ADULT - NS AS ACTIVITY OBS
Showering allowed/Do not make important decisions/Do not drive or operate machinery/No Heavy lifting/straining/Walking-Indoors allowed/Stairs allowed

## 2017-06-22 NOTE — DISCHARGE NOTE ADULT - MEDICATION SUMMARY - MEDICATIONS TO TAKE
I will START or STAY ON the medications listed below when I get home from the hospital:    rolling walker  -- Dx: Right THR  -- Indication: For Home equipment    3:1 commode  -- Dx: Right THR  -- Indication: For Home equipment    Hip kit  -- Dx: Right THR  -- Indication: For Home equipment    celecoxib 200 mg oral capsule  -- 1 cap(s) by mouth 2 times a day (after meals)  -- Indication: For Heterotopic bone prevention    Actamin 500 mg oral tablet  -- 2 tab(s) by mouth every 8 hours for 2 weeks then as needed for mild pain  -- Indication: For Pain    traMADol 50 mg oral tablet  -- 1 tab(s) by mouth every 4 hours, As needed, Pain MDD:6  -- Indication: For Pain    aspirin 81 mg oral delayed release tablet  -- 2 tab(s) by mouth every 12 hours for 6 weeks MDD:4  -- Indication: For Blood clot prevention    aluminum hydroxide-magnesium hydroxide 200 mg-200 mg/5 mL oral suspension  -- 30 milliliter(s) by mouth 4 times a day, As needed, Indigestion  -- Indication: For indigestion    magnesium hydroxide 8% oral suspension  -- 30 milliliter(s) by mouth once a day, As needed, Constipation  -- Indication: For constipation    atorvastatin 40 mg oral tablet  -- 1 tab(s) by mouth once a day  -- Indication: For Home med    docusate sodium 100 mg oral capsule  -- 1 cap(s) by mouth 3 times a day  -- Indication: For constipation    polyethylene glycol 3350 oral powder for reconstitution  -- 17 gram(s) by mouth once a day, As needed, Constipation  -- Indication: For constiaption    senna oral tablet  -- 2 tab(s) by mouth once a day (at bedtime)  -- Indication: For constipation    pantoprazole 40 mg oral delayed release tablet  -- 1 tab(s) by mouth once a day (before a meal)  -- Indication: For Stomach ulcer prevention    levothyroxine 100 mcg (0.1 mg) oral tablet  -- 1 tab(s) by mouth once a day  -- Indication: For Home med    oxybutynin 5 mg/24 hours oral tablet, extended release  --  by mouth once a day  -- Indication: For Home med    multivitamin  --   once a day  -- Indication: For Home med

## 2017-06-23 DIAGNOSIS — R73.9 HYPERGLYCEMIA, UNSPECIFIED: ICD-10-CM

## 2017-06-23 LAB
ALBUMIN SERPL ELPH-MCNC: 3 G/DL — LOW (ref 3.3–5)
ALP SERPL-CCNC: 74 U/L — SIGNIFICANT CHANGE UP (ref 30–120)
ALT FLD-CCNC: 20 U/L DA — SIGNIFICANT CHANGE UP (ref 10–60)
ANION GAP SERPL CALC-SCNC: 9 MMOL/L — SIGNIFICANT CHANGE UP (ref 5–17)
AST SERPL-CCNC: 23 U/L — SIGNIFICANT CHANGE UP (ref 10–40)
BILIRUB SERPL-MCNC: 0.7 MG/DL — SIGNIFICANT CHANGE UP (ref 0.2–1.2)
BUN SERPL-MCNC: 9 MG/DL — SIGNIFICANT CHANGE UP (ref 7–23)
CALCIUM SERPL-MCNC: 8.4 MG/DL — SIGNIFICANT CHANGE UP (ref 8.4–10.5)
CHLORIDE SERPL-SCNC: 105 MMOL/L — SIGNIFICANT CHANGE UP (ref 96–108)
CO2 SERPL-SCNC: 29 MMOL/L — SIGNIFICANT CHANGE UP (ref 22–31)
CREAT SERPL-MCNC: 0.51 MG/DL — SIGNIFICANT CHANGE UP (ref 0.5–1.3)
GLUCOSE SERPL-MCNC: 110 MG/DL — HIGH (ref 70–99)
HCT VFR BLD CALC: 34.8 % — SIGNIFICANT CHANGE UP (ref 34.5–45)
HGB BLD-MCNC: 12.3 G/DL — SIGNIFICANT CHANGE UP (ref 11.5–15.5)
MCHC RBC-ENTMCNC: 30.6 PG — SIGNIFICANT CHANGE UP (ref 27–34)
MCHC RBC-ENTMCNC: 35.2 GM/DL — SIGNIFICANT CHANGE UP (ref 32–36)
MCV RBC AUTO: 86.7 FL — SIGNIFICANT CHANGE UP (ref 80–100)
PLATELET # BLD AUTO: 189 K/UL — SIGNIFICANT CHANGE UP (ref 150–400)
POTASSIUM SERPL-MCNC: 3.9 MMOL/L — SIGNIFICANT CHANGE UP (ref 3.5–5.3)
POTASSIUM SERPL-SCNC: 3.9 MMOL/L — SIGNIFICANT CHANGE UP (ref 3.5–5.3)
PROT SERPL-MCNC: 6 G/DL — SIGNIFICANT CHANGE UP (ref 6–8.3)
RBC # BLD: 4.01 M/UL — SIGNIFICANT CHANGE UP (ref 3.8–5.2)
RBC # FLD: 12.5 % — SIGNIFICANT CHANGE UP (ref 10.3–14.5)
SODIUM SERPL-SCNC: 143 MMOL/L — SIGNIFICANT CHANGE UP (ref 135–145)
WBC # BLD: 9.5 K/UL — SIGNIFICANT CHANGE UP (ref 3.8–10.5)
WBC # FLD AUTO: 9.5 K/UL — SIGNIFICANT CHANGE UP (ref 3.8–10.5)

## 2017-06-23 PROCEDURE — 99233 SBSQ HOSP IP/OBS HIGH 50: CPT

## 2017-06-23 RX ORDER — OXYBUTYNIN CHLORIDE 5 MG
0 TABLET ORAL
Qty: 0 | Refills: 0 | COMMUNITY

## 2017-06-23 RX ORDER — LEVOTHYROXINE SODIUM 125 MCG
1 TABLET ORAL
Qty: 0 | Refills: 0 | COMMUNITY

## 2017-06-23 RX ORDER — ATORVASTATIN CALCIUM 80 MG/1
1 TABLET, FILM COATED ORAL
Qty: 0 | Refills: 0 | COMMUNITY

## 2017-06-23 RX ORDER — TRAMADOL HYDROCHLORIDE 50 MG/1
100 TABLET ORAL EVERY 4 HOURS
Qty: 0 | Refills: 0 | Status: DISCONTINUED | OUTPATIENT
Start: 2017-06-23 | End: 2017-06-25

## 2017-06-23 RX ORDER — TRAMADOL HYDROCHLORIDE 50 MG/1
50 TABLET ORAL EVERY 4 HOURS
Qty: 0 | Refills: 0 | Status: DISCONTINUED | OUTPATIENT
Start: 2017-06-23 | End: 2017-06-25

## 2017-06-23 RX ADMIN — TRAMADOL HYDROCHLORIDE 50 MILLIGRAM(S): 50 TABLET ORAL at 21:35

## 2017-06-23 RX ADMIN — CELECOXIB 200 MILLIGRAM(S): 200 CAPSULE ORAL at 18:47

## 2017-06-23 RX ADMIN — Medication 400 MILLIGRAM(S): at 04:39

## 2017-06-23 RX ADMIN — Medication 1000 MILLIGRAM(S): at 18:47

## 2017-06-23 RX ADMIN — OXYCODONE HYDROCHLORIDE 5 MILLIGRAM(S): 5 TABLET ORAL at 09:00

## 2017-06-23 RX ADMIN — CELECOXIB 200 MILLIGRAM(S): 200 CAPSULE ORAL at 18:46

## 2017-06-23 RX ADMIN — Medication 1000 MILLIGRAM(S): at 11:18

## 2017-06-23 RX ADMIN — Medication 100 MILLIGRAM(S): at 21:03

## 2017-06-23 RX ADMIN — HYDROMORPHONE HYDROCHLORIDE 0.5 MILLIGRAM(S): 2 INJECTION INTRAMUSCULAR; INTRAVENOUS; SUBCUTANEOUS at 01:45

## 2017-06-23 RX ADMIN — ATORVASTATIN CALCIUM 40 MILLIGRAM(S): 80 TABLET, FILM COATED ORAL at 21:03

## 2017-06-23 RX ADMIN — CELECOXIB 200 MILLIGRAM(S): 200 CAPSULE ORAL at 08:19

## 2017-06-23 RX ADMIN — Medication 1000 MILLIGRAM(S): at 18:46

## 2017-06-23 RX ADMIN — Medication 1000 MILLIGRAM(S): at 05:30

## 2017-06-23 RX ADMIN — Medication 162 MILLIGRAM(S): at 08:19

## 2017-06-23 RX ADMIN — HYDROMORPHONE HYDROCHLORIDE 0.5 MILLIGRAM(S): 2 INJECTION INTRAMUSCULAR; INTRAVENOUS; SUBCUTANEOUS at 05:34

## 2017-06-23 RX ADMIN — PANTOPRAZOLE SODIUM 40 MILLIGRAM(S): 20 TABLET, DELAYED RELEASE ORAL at 05:19

## 2017-06-23 RX ADMIN — HYDROMORPHONE HYDROCHLORIDE 0.5 MILLIGRAM(S): 2 INJECTION INTRAMUSCULAR; INTRAVENOUS; SUBCUTANEOUS at 05:19

## 2017-06-23 RX ADMIN — Medication 100 MILLIGRAM(S): at 05:20

## 2017-06-23 RX ADMIN — Medication 100 MICROGRAM(S): at 05:20

## 2017-06-23 RX ADMIN — TRAMADOL HYDROCHLORIDE 50 MILLIGRAM(S): 50 TABLET ORAL at 12:52

## 2017-06-23 RX ADMIN — TRAMADOL HYDROCHLORIDE 50 MILLIGRAM(S): 50 TABLET ORAL at 21:02

## 2017-06-23 RX ADMIN — OXYCODONE HYDROCHLORIDE 5 MILLIGRAM(S): 5 TABLET ORAL at 08:19

## 2017-06-23 RX ADMIN — CELECOXIB 200 MILLIGRAM(S): 200 CAPSULE ORAL at 08:21

## 2017-06-23 RX ADMIN — HYDROMORPHONE HYDROCHLORIDE 0.5 MILLIGRAM(S): 2 INJECTION INTRAMUSCULAR; INTRAVENOUS; SUBCUTANEOUS at 01:33

## 2017-06-23 RX ADMIN — SENNA PLUS 2 TABLET(S): 8.6 TABLET ORAL at 21:02

## 2017-06-23 RX ADMIN — Medication 100 MILLIGRAM(S): at 12:51

## 2017-06-23 RX ADMIN — Medication 5 MILLIGRAM(S): at 05:20

## 2017-06-23 RX ADMIN — Medication 5 MILLIGRAM(S): at 18:46

## 2017-06-23 RX ADMIN — TRAMADOL HYDROCHLORIDE 50 MILLIGRAM(S): 50 TABLET ORAL at 13:30

## 2017-06-23 RX ADMIN — Medication 162 MILLIGRAM(S): at 21:02

## 2017-06-24 LAB
HCT VFR BLD CALC: 33.7 % — LOW (ref 34.5–45)
HGB BLD-MCNC: 11.4 G/DL — LOW (ref 11.5–15.5)
MCHC RBC-ENTMCNC: 29.6 PG — SIGNIFICANT CHANGE UP (ref 27–34)
MCHC RBC-ENTMCNC: 34 GM/DL — SIGNIFICANT CHANGE UP (ref 32–36)
MCV RBC AUTO: 87.3 FL — SIGNIFICANT CHANGE UP (ref 80–100)
PLATELET # BLD AUTO: 176 K/UL — SIGNIFICANT CHANGE UP (ref 150–400)
RBC # BLD: 3.86 M/UL — SIGNIFICANT CHANGE UP (ref 3.8–5.2)
RBC # FLD: 12.4 % — SIGNIFICANT CHANGE UP (ref 10.3–14.5)
WBC # BLD: 8.8 K/UL — SIGNIFICANT CHANGE UP (ref 3.8–10.5)
WBC # FLD AUTO: 8.8 K/UL — SIGNIFICANT CHANGE UP (ref 3.8–10.5)

## 2017-06-24 PROCEDURE — 99233 SBSQ HOSP IP/OBS HIGH 50: CPT

## 2017-06-24 RX ADMIN — CELECOXIB 200 MILLIGRAM(S): 200 CAPSULE ORAL at 17:14

## 2017-06-24 RX ADMIN — Medication 100 MILLIGRAM(S): at 13:32

## 2017-06-24 RX ADMIN — CELECOXIB 200 MILLIGRAM(S): 200 CAPSULE ORAL at 09:07

## 2017-06-24 RX ADMIN — PANTOPRAZOLE SODIUM 40 MILLIGRAM(S): 20 TABLET, DELAYED RELEASE ORAL at 05:55

## 2017-06-24 RX ADMIN — Medication 1000 MILLIGRAM(S): at 09:01

## 2017-06-24 RX ADMIN — TRAMADOL HYDROCHLORIDE 50 MILLIGRAM(S): 50 TABLET ORAL at 09:50

## 2017-06-24 RX ADMIN — Medication 162 MILLIGRAM(S): at 20:56

## 2017-06-24 RX ADMIN — CELECOXIB 200 MILLIGRAM(S): 200 CAPSULE ORAL at 18:08

## 2017-06-24 RX ADMIN — Medication 5 MILLIGRAM(S): at 17:13

## 2017-06-24 RX ADMIN — SENNA PLUS 2 TABLET(S): 8.6 TABLET ORAL at 20:57

## 2017-06-24 RX ADMIN — Medication 100 MICROGRAM(S): at 05:55

## 2017-06-24 RX ADMIN — ATORVASTATIN CALCIUM 40 MILLIGRAM(S): 80 TABLET, FILM COATED ORAL at 20:57

## 2017-06-24 RX ADMIN — TRAMADOL HYDROCHLORIDE 50 MILLIGRAM(S): 50 TABLET ORAL at 21:30

## 2017-06-24 RX ADMIN — Medication 1000 MILLIGRAM(S): at 09:11

## 2017-06-24 RX ADMIN — TRAMADOL HYDROCHLORIDE 50 MILLIGRAM(S): 50 TABLET ORAL at 09:07

## 2017-06-24 RX ADMIN — Medication 162 MILLIGRAM(S): at 09:01

## 2017-06-24 RX ADMIN — CELECOXIB 200 MILLIGRAM(S): 200 CAPSULE ORAL at 09:11

## 2017-06-24 RX ADMIN — TRAMADOL HYDROCHLORIDE 50 MILLIGRAM(S): 50 TABLET ORAL at 14:20

## 2017-06-24 RX ADMIN — Medication 100 MILLIGRAM(S): at 05:55

## 2017-06-24 RX ADMIN — Medication 1000 MILLIGRAM(S): at 17:13

## 2017-06-24 RX ADMIN — TRAMADOL HYDROCHLORIDE 50 MILLIGRAM(S): 50 TABLET ORAL at 13:32

## 2017-06-24 RX ADMIN — Medication 1000 MILLIGRAM(S): at 18:08

## 2017-06-24 RX ADMIN — TRAMADOL HYDROCHLORIDE 50 MILLIGRAM(S): 50 TABLET ORAL at 20:57

## 2017-06-24 RX ADMIN — Medication 100 MILLIGRAM(S): at 20:57

## 2017-06-24 RX ADMIN — TRAMADOL HYDROCHLORIDE 50 MILLIGRAM(S): 50 TABLET ORAL at 04:30

## 2017-06-24 RX ADMIN — Medication 5 MILLIGRAM(S): at 05:55

## 2017-06-24 RX ADMIN — TRAMADOL HYDROCHLORIDE 50 MILLIGRAM(S): 50 TABLET ORAL at 18:10

## 2017-06-24 RX ADMIN — TRAMADOL HYDROCHLORIDE 50 MILLIGRAM(S): 50 TABLET ORAL at 04:02

## 2017-06-24 RX ADMIN — TRAMADOL HYDROCHLORIDE 50 MILLIGRAM(S): 50 TABLET ORAL at 17:14

## 2017-06-25 VITALS
TEMPERATURE: 99 F | OXYGEN SATURATION: 96 % | HEART RATE: 69 BPM | DIASTOLIC BLOOD PRESSURE: 61 MMHG | SYSTOLIC BLOOD PRESSURE: 106 MMHG | RESPIRATION RATE: 16 BRPM

## 2017-06-25 LAB
ANION GAP SERPL CALC-SCNC: 5 MMOL/L — SIGNIFICANT CHANGE UP (ref 5–17)
BUN SERPL-MCNC: 11 MG/DL — SIGNIFICANT CHANGE UP (ref 7–23)
CALCIUM SERPL-MCNC: 8.5 MG/DL — SIGNIFICANT CHANGE UP (ref 8.4–10.5)
CHLORIDE SERPL-SCNC: 106 MMOL/L — SIGNIFICANT CHANGE UP (ref 96–108)
CO2 SERPL-SCNC: 30 MMOL/L — SIGNIFICANT CHANGE UP (ref 22–31)
CREAT SERPL-MCNC: 0.57 MG/DL — SIGNIFICANT CHANGE UP (ref 0.5–1.3)
GLUCOSE SERPL-MCNC: 106 MG/DL — HIGH (ref 70–99)
HCT VFR BLD CALC: 33.6 % — LOW (ref 34.5–45)
HGB BLD-MCNC: 11.1 G/DL — LOW (ref 11.5–15.5)
MCHC RBC-ENTMCNC: 28.7 PG — SIGNIFICANT CHANGE UP (ref 27–34)
MCHC RBC-ENTMCNC: 33.1 GM/DL — SIGNIFICANT CHANGE UP (ref 32–36)
MCV RBC AUTO: 86.7 FL — SIGNIFICANT CHANGE UP (ref 80–100)
PLATELET # BLD AUTO: 194 K/UL — SIGNIFICANT CHANGE UP (ref 150–400)
POTASSIUM SERPL-MCNC: 3.8 MMOL/L — SIGNIFICANT CHANGE UP (ref 3.5–5.3)
POTASSIUM SERPL-SCNC: 3.8 MMOL/L — SIGNIFICANT CHANGE UP (ref 3.5–5.3)
RBC # BLD: 3.88 M/UL — SIGNIFICANT CHANGE UP (ref 3.8–5.2)
RBC # FLD: 12 % — SIGNIFICANT CHANGE UP (ref 10.3–14.5)
SODIUM SERPL-SCNC: 141 MMOL/L — SIGNIFICANT CHANGE UP (ref 135–145)
WBC # BLD: 8.7 K/UL — SIGNIFICANT CHANGE UP (ref 3.8–10.5)
WBC # FLD AUTO: 8.7 K/UL — SIGNIFICANT CHANGE UP (ref 3.8–10.5)

## 2017-06-25 PROCEDURE — C1889: CPT

## 2017-06-25 PROCEDURE — 80048 BASIC METABOLIC PNL TOTAL CA: CPT

## 2017-06-25 PROCEDURE — 97165 OT EVAL LOW COMPLEX 30 MIN: CPT

## 2017-06-25 PROCEDURE — 99233 SBSQ HOSP IP/OBS HIGH 50: CPT

## 2017-06-25 PROCEDURE — 97530 THERAPEUTIC ACTIVITIES: CPT

## 2017-06-25 PROCEDURE — 76000 FLUOROSCOPY <1 HR PHYS/QHP: CPT

## 2017-06-25 PROCEDURE — 85027 COMPLETE CBC AUTOMATED: CPT

## 2017-06-25 PROCEDURE — 97161 PT EVAL LOW COMPLEX 20 MIN: CPT

## 2017-06-25 PROCEDURE — 88311 DECALCIFY TISSUE: CPT

## 2017-06-25 PROCEDURE — 88305 TISSUE EXAM BY PATHOLOGIST: CPT

## 2017-06-25 PROCEDURE — 94664 DEMO&/EVAL PT USE INHALER: CPT

## 2017-06-25 PROCEDURE — 97116 GAIT TRAINING THERAPY: CPT

## 2017-06-25 PROCEDURE — 85018 HEMOGLOBIN: CPT

## 2017-06-25 PROCEDURE — 80053 COMPREHEN METABOLIC PANEL: CPT

## 2017-06-25 PROCEDURE — C1776: CPT

## 2017-06-25 PROCEDURE — 97535 SELF CARE MNGMENT TRAINING: CPT

## 2017-06-25 PROCEDURE — 97110 THERAPEUTIC EXERCISES: CPT

## 2017-06-25 RX ORDER — ACETAMINOPHEN 500 MG
2 TABLET ORAL
Qty: 0 | Refills: 0 | COMMUNITY
Start: 2017-06-25 | End: 2017-07-06

## 2017-06-25 RX ORDER — SENNA PLUS 8.6 MG/1
2 TABLET ORAL
Qty: 0 | Refills: 0 | COMMUNITY
Start: 2017-06-25

## 2017-06-25 RX ORDER — ASPIRIN/CALCIUM CARB/MAGNESIUM 324 MG
2 TABLET ORAL
Qty: 168 | Refills: 0 | OUTPATIENT
Start: 2017-06-25

## 2017-06-25 RX ORDER — DOCUSATE SODIUM 100 MG
1 CAPSULE ORAL
Qty: 0 | Refills: 0 | COMMUNITY
Start: 2017-06-25

## 2017-06-25 RX ORDER — MAGNESIUM HYDROXIDE 400 MG/1
30 TABLET, CHEWABLE ORAL
Qty: 0 | Refills: 0 | COMMUNITY
Start: 2017-06-25

## 2017-06-25 RX ORDER — TRAMADOL HYDROCHLORIDE 50 MG/1
1 TABLET ORAL
Qty: 60 | Refills: 0 | OUTPATIENT
Start: 2017-06-25

## 2017-06-25 RX ORDER — POLYETHYLENE GLYCOL 3350 17 G/17G
17 POWDER, FOR SOLUTION ORAL
Qty: 0 | Refills: 0 | COMMUNITY
Start: 2017-06-25

## 2017-06-25 RX ORDER — PANTOPRAZOLE SODIUM 20 MG/1
1 TABLET, DELAYED RELEASE ORAL
Qty: 42 | Refills: 0 | OUTPATIENT
Start: 2017-06-25

## 2017-06-25 RX ADMIN — TRAMADOL HYDROCHLORIDE 50 MILLIGRAM(S): 50 TABLET ORAL at 08:40

## 2017-06-25 RX ADMIN — Medication 1000 MILLIGRAM(S): at 02:03

## 2017-06-25 RX ADMIN — CELECOXIB 200 MILLIGRAM(S): 200 CAPSULE ORAL at 07:53

## 2017-06-25 RX ADMIN — TRAMADOL HYDROCHLORIDE 50 MILLIGRAM(S): 50 TABLET ORAL at 07:53

## 2017-06-25 RX ADMIN — Medication 100 MICROGRAM(S): at 05:25

## 2017-06-25 RX ADMIN — PANTOPRAZOLE SODIUM 40 MILLIGRAM(S): 20 TABLET, DELAYED RELEASE ORAL at 05:24

## 2017-06-25 RX ADMIN — Medication 1000 MILLIGRAM(S): at 07:49

## 2017-06-25 RX ADMIN — Medication 162 MILLIGRAM(S): at 07:49

## 2017-06-25 RX ADMIN — Medication 1000 MILLIGRAM(S): at 07:53

## 2017-06-25 RX ADMIN — CELECOXIB 200 MILLIGRAM(S): 200 CAPSULE ORAL at 07:49

## 2017-06-25 RX ADMIN — Medication 100 MILLIGRAM(S): at 05:25

## 2017-06-25 RX ADMIN — Medication 5 MILLIGRAM(S): at 05:25

## 2017-06-25 NOTE — PROGRESS NOTE ADULT - SUBJECTIVE AND OBJECTIVE BOX
Discharge medication calendar:  ASA EC 162mg q12h x 6 weeks  APAP 1000mg q8h x 2-3 weeks  Celecoxib 200mg q12h x 21 days  Pantoprazole 40mg QAM  Narcotic PRN  Docusate 100mg TID while taking narcotic  Senna, bisacodyl, or Miralax PRN for treatment of constipation
ORTHOPEDIC PA PROGRESS NOTE  CATRACHO MANZO      61y Female                                                                                                                               POD # 3       STATUS POST:               Pre-Op Dx: Primary osteoarthritis of right hip    Post-Op Dx:  Primary osteoarthritis of right hip    Procedure: Arthroplasty, hip replacement: Right hip, anterior approach                                                Patient comfortable pain controlled.  Voiding urine passing gas and tolerating p.o diet.   Current Pain Management:  [ ] PCA   [x ] Po Analgesics [ ] IM /IV Anagesics     T(F): 98.6  HR: 69  BP: 106/61  RR: 16  SpO2: 96%  Wt(kg): --                        11.1   8.7   )-----------( 194      ( 25 Jun 2017 06:21 )             33.6                     06-25    141  |  106  |  11  ----------------------------<  106<H>  3.8   |  30  |  0.57    Ca    8.5      25 Jun 2017 06:21      Physical Exam :    -   Dressing changed sterile.   -   Wound C/D/I.   -   Distal Neurvascular status intact grossly.   -   Warm well perfused; capillary refill <3 seconds   -   (+)EHL/FHL 5/5  -   (+) Sensation to light touch  -   (-) Calf tenderness Bilaterally    A/P: 61y Female s/p R anterior TROY    -   Ortho Stable  -   Pain control   -   Medicine to follow  -   DVT ppx:     [ xSCDs     [ x] ASA     [ ] Eliquis     [ ] Lovenox  -   Weight bearing status:  WBAT x[ ]        PWB    [ ]     TTWB  [ ]      NWB  [ ]   -  Dispo:     Home [x ]     Acute Rehab [ ]     [facility] [ ]     TBD [ ]
POST OPERATIVE DAY #: 0    61y Female  s/p  Right   Anterior THR                        SUBJECTIVE: Patient seen and examined at bedside.     Pain:  well controlled     Pain scale:  2 /10  Denies CP, SOB, N/V/D, weakness, numbness   No new complains     OBJECTIVE:     Vital Signs Last 24 Hrs  T(C): 36.3, Max: 36.6 (06-22 @ 07:33)  T(F): 97.3, Max: 97.9 (06-22 @ 07:33)  HR: 64 (58 - 71)  BP: 121/66 (103/62 - 121/66)  BP(mean): --  RR: 16 (14 - 19)  SpO2: 100% (96% - 100%)    Affected extremity: RLE         Dressing: clean/dry/intact                     HV x2 intact, on self suction         Sensation: intact to light touch          Motor exam:  5/ 5 Tibialis Anterior/Gastrocnemius-Soleus, EHL/FHL         warm, well-perfused; capillary refill < 3 seconds         negative calf tenderness B/L LE    HV#1:  15cc  HV#2:  0cc      MEDICATIONS:  Infection prophylaxis:  vancomycin  IVPB 1250milliGRAM(s) IV Intermittent once    Pain management:  ondansetron Injectable 4milliGRAM(s) IV Push every 6 hours PRN  HYDROmorphone  Injectable 0.5milliGRAM(s) IV Push every 10 minutes PRN  celecoxib 200milliGRAM(s) Oral two times a day after meals  oxyCODONE IR 5milliGRAM(s) Oral every 3 hours PRN  oxyCODONE IR 10milliGRAM(s) Oral every 3 hours PRN  HYDROmorphone  Injectable 0.5milliGRAM(s) IV Push every 3 hours PRN  acetaminophen  IVPB. 1000milliGRAM(s) IV Intermittent every 6 hours    DVT prophylaxis: Ecotrin       RADIOLOGY & ADDITIONAL STUDIES:    ASSESSMENT AND PLAN:     - Analgesic pain control  - DVT prophylaxis:  mg  twice a day   SCDs       - Antibiotics:  Vancomycin for 24 hours   - HO prophylaxis: Celebrex 200mg twice a day x 21 days   - PT/OT: Weight Bearing Status:  Weight bearing as tolerated, OOBTC           Anterior Hip precautions     -  Incentive spirometry  - IVF  - Advance diet as tolerated  - Hospitalist is following  - Follow up HV output  -  Follow up labs  -  Disposition: pending PT evaluation
POST OPERATIVE DAY #: 2  STATUS POST: Right Anterior THR                       SUBJECTIVE: Patient seen and examined. + Voiding, + flatus. No BM. Eating well and tolerating diet.  Had a little headache this morning and is awaiting tylenol  Reported Pain score = 2-3 at hip    OBJECTIVE:     Vital Signs Last 24 Hrs  T(C): 36.9, Max: 37.1 (06-23 @ 23:01)  T(F): 98.4, Max: 98.7 (06-23 @ 23:01)  HR: 68 (64 - 77)  BP: 98/62 (84/49 - 117/74)  RR: 16 (16 - 16)  SpO2: 93% (93% - 95%)    Right hip:          Dressing removed: incision clean/dry/intact; sutures in place, hemovac drains x 2 in place =75cc & 35 cc output overnight  Bilateral LEs:         Sensation:  intact to light touch :          Motor exam:  5/5 dorsiflexion/plantarflexion/EHL          2+ DP pulses          calf supple, NT    LABS:                        11.4   8.8   )-----------( 176      ( 24 Jun 2017 07:10 )             33.7     06-23    143  |  105  |  9   ----------------------------<  110<H>  3.9   |  29  |  0.51    Ca    8.4      23 Jun 2017 08:08    TPro  6.0  /  Alb  3.0<L>  /  TBili  0.7  /  DBili  x   /  AST  23  /  ALT  20  /  AlkPhos  74  06-23          MEDICATIONS:  Anticoagulation:  aspirin enteric coated 162milliGRAM(s) Oral every 12 hours      Pain medications:   celecoxib 200milliGRAM(s) Oral two times a day after meals  acetaminophen   Tablet. 1000milliGRAM(s) Oral every 8 hours  traMADol 50milliGRAM(s) Oral every 4 hours PRN  traMADol 100milliGRAM(s) Oral every 4 hours PRN        A/P :   s/p Right Anterior THR   POD # 2  -    dressing changed, hemovac drains removed and dressing placed over drain sites  -    Pain control: acetaminophen, tramadol  -    Celebrex for HO ppx  -    DVT ppx: Aspirin  -    Weight bearing status: WBAT   -    Continue anterior total hip precautions  -    Physical Therapy  -    Occupational Therapy  -    Discharge plan: home tomorrow with home care
Post Op     CATRACHO MANZO      61y        Female                                                                                                                 T(C): 36.9, Max: 37.2 (06-22 @ 15:06)  HR: 60 (58 - 71)  BP: 97/59 (97/59 - 121/66)  RR: 16 (14 - 19)  SpO2: 92% (92% - 100%)      S/P  right anterior total hip  Patient denies shortness of breath, chest pain, dyspnea, No complaints  Pain is 3/10    Physical Exam    Extremity: Bilaterally:  No holmon                                           No Cord                                          PAS on                                          Neurovascular intact                                          Motor intact EHL/FHL                                          Sensation intact                                          Pulses intact DP/PT                                         Calves Soft                                         Dressing Clean / Dry / Intact                                         Capillary refill with 5 seconds                          11.7   x     )-----------( x        ( 22 Jun 2017 16:52 )             34.0       06-22    143  |  108  |  12  ----------------------------<  146<H>  3.7   |  28  |  0.51    Ca    8.4      22 Jun 2017 16:52        A/P  -- S/P right ant total hip 162 bid    -  Medicine To Follow   - DVT prophylaxis PAS /aspirin 162 bid  - PT & OT   - Analagesia  - Incentive Spirometry  - Discharge Planning  - Safety Precautions  -  CBC , BMP daily
Patient is a 61y old  Female who presents with a chief complaint of Right hip pain (22 Jun 2017 12:20)      INTERVAL HPI/OVERNIGHT EVENTS: pt feels better, denies ant sob or chest pain.     Pain Location & Control: controlled.     MEDICATIONS  (STANDING):  pantoprazole    Tablet 40milliGRAM(s) Oral before breakfast  senna 2Tablet(s) Oral at bedtime  docusate sodium 100milliGRAM(s) Oral three times a day  aspirin enteric coated 162milliGRAM(s) Oral every 12 hours  celecoxib 200milliGRAM(s) Oral two times a day after meals  atorvastatin 40milliGRAM(s) Oral daily  levothyroxine 100MICROGram(s) Oral daily  oxybutynin 5milliGRAM(s) Oral two times a day  acetaminophen   Tablet. 1000milliGRAM(s) Oral every 8 hours    MEDICATIONS  (PRN):  aluminum hydroxide/magnesium hydroxide/simethicone Suspension 30milliLiter(s) Oral four times a day PRN Indigestion  ondansetron Injectable 4milliGRAM(s) IV Push every 6 hours PRN Nausea and/or Vomiting  polyethylene glycol 3350 17Gram(s) Oral daily PRN Constipation  magnesium hydroxide Suspension 30milliLiter(s) Oral daily PRN Constipation  oxyCODONE IR 5milliGRAM(s) Oral every 3 hours PRN Mild Pain  oxyCODONE IR 10milliGRAM(s) Oral every 3 hours PRN Moderate Pain  HYDROmorphone  Injectable 0.5milliGRAM(s) IV Push every 3 hours PRN Severe Pain      Allergies    bacitracin topical (Other)  penicillins (Other)    Intolerances        REVIEW OF SYSTEMS:  CONSTITUTIONAL: No fever, weight loss, or fatigue  EYES: No eye pain, visual disturbances, or discharge  ENMT:  No difficulty hearing, tinnitus, vertigo; No sinus or throat pain  NECK: No pain or stiffness  BREASTS: No pain, masses, or nipple discharge  RESPIRATORY: No cough, wheezing, chills or hemoptysis; No shortness of breath  CARDIOVASCULAR: No chest pain, palpitations, dizziness, or leg swelling  GASTROINTESTINAL: No abdominal or epigastric pain. No nausea, vomiting, or hematemesis; No diarrhea or constipation. No melena or hematochezia.  GENITOURINARY: No dysuria, frequency, hematuria, or incontinence  NEUROLOGICAL: No headaches, memory loss, loss of strength, numbness, or tremors  SKIN: No itching, burning, rashes, or lesions   LYMPH NODES: No enlarged glands  ENDOCRINE: No heat or cold intolerance; No hair loss; No polydipsia or polyuria  MUSCULOSKELETAL: No back pain  PSYCHIATRIC: No depression, anxiety, mood swings, or difficulty sleeping  HEME/LYMPH: No easy bruising, or bleeding gums  ALLERGY AND IMMUNOLOGIC: No hives or eczema    Vital Signs Last 24 Hrs  T(C): 36.8, Max: 37.2 (06-22 @ 15:06)  T(F): 98.2, Max: 98.9 (06-22 @ 15:06)  HR: 63 (59 - 71)  BP: 95/58 (95/58 - 121/66)  BP(mean): --  RR: 14 (14 - 16)  SpO2: 91% (91% - 100%)    PHYSICAL EXAM:  GENERAL: NAD, well-groomed, well-developed  HEAD:  Atraumatic, Normocephalic  EYES: EOMI, PERRLA, conjunctiva and sclera clear  ENMT: No tonsillar erythema, exudates, or enlargement; Moist mucous membranes, Good dentition, No lesions  NECK: Supple, No JVD, Normal thyroid  NERVOUS SYSTEM:  Alert & Oriented X3, Good concentration; Motor Strength 5/5 B/L upper and lower extremities; DTRs 2+ intact and symmetric  CHEST/LUNG: Clear to auscultation bilaterally; No rales, rhonchi, wheezing, or rubs  HEART: Regular rate and rhythm; No murmurs, rubs, or gallops  ABDOMEN: Soft, Nontender, Nondistended; Bowel sounds present  EXTREMITIES:  2+ Peripheral Pulses, No clubbing or cyanosis  LYMPH: No lymphadenopathy noted  SKIN: No rashes or lesions  INCISION:  Dressing dry and intact    LABS:                        12.3   9.5   )-----------( 189      ( 23 Jun 2017 08:08 )             34.8     23 Jun 2017 08:08    143    |  105    |  9      ----------------------------<  110    3.9     |  29     |  0.51     Ca    8.4        23 Jun 2017 08:08    TPro  6.0    /  Alb  3.0    /  TBili  0.7    /  DBili  x      /  AST  23     /  ALT  20     /  AlkPhos  74     23 Jun 2017 08:08        CAPILLARY BLOOD GLUCOSE      RADIOLOGY & ADDITIONAL TESTS:    Imaging Personally Reviewed:  [ ] YES  [ ] NO    Consultant(s) Notes Reviewed:  [x ] YES  [ ] NO    Care Discussed with Consultants/Other Providers [x ] YES  [ ] NO
Patient is a 61y old  Female who presents with a chief complaint of Right hip pain (22 Jun 2017 12:20)      INTERVAL HPI/OVERNIGHT EVENTS:  Pt is seen and examined.  feels better. sitting on chair.pain is controlled.    Pain Location & Control:     MEDICATIONS  (STANDING):  pantoprazole    Tablet 40milliGRAM(s) Oral before breakfast  senna 2Tablet(s) Oral at bedtime  docusate sodium 100milliGRAM(s) Oral three times a day  aspirin enteric coated 162milliGRAM(s) Oral every 12 hours  celecoxib 200milliGRAM(s) Oral two times a day after meals  atorvastatin 40milliGRAM(s) Oral daily  levothyroxine 100MICROGram(s) Oral daily  oxybutynin 5milliGRAM(s) Oral two times a day  acetaminophen   Tablet. 1000milliGRAM(s) Oral every 8 hours    MEDICATIONS  (PRN):  aluminum hydroxide/magnesium hydroxide/simethicone Suspension 30milliLiter(s) Oral four times a day PRN Indigestion  ondansetron Injectable 4milliGRAM(s) IV Push every 6 hours PRN Nausea and/or Vomiting  polyethylene glycol 3350 17Gram(s) Oral daily PRN Constipation  bisacodyl Suppository 10milliGRAM(s) Rectal daily PRN Constipation  magnesium hydroxide Suspension 30milliLiter(s) Oral daily PRN Constipation  traMADol 50milliGRAM(s) Oral every 4 hours PRN Mild Pain  traMADol 100milliGRAM(s) Oral every 4 hours PRN Moderate Pain      Allergies    bacitracin topical (Other)  penicillins (Other)    Intolerances            Vital Signs Last 24 Hrs  T(C): 37, Max: 37.2 (06-24 @ 11:15)  T(F): 98.6, Max: 98.9 (06-24 @ 11:15)  HR: 69 (61 - 78)  BP: 106/61 (97/61 - 106/61)  BP(mean): --  RR: 16 (14 - 16)  SpO2: 96% (92% - 96%)        LABS:                        11.1   8.7   )-----------( 194      ( 25 Jun 2017 06:21 )             33.6     25 Jun 2017 06:21    141    |  106    |  11     ----------------------------<  106    3.8     |  30     |  0.57     Ca    8.5        25 Jun 2017 06:21        RADIOLOGY & ADDITIONAL TESTS:    Imaging Personally Reviewed:  [ ] YES  [ ] NO    Consultant(s) Notes Reviewed:  [ x] YES  [ ] NO    Care Discussed with Consultants/Other Providers [x ] YES  [ ] NO
late entry.  Patient is a 61y old  Female who presents with a chief complaint of Right hip pain s/p Right anterior hip replacement (22 Jun 2017 12:20)      INTERVAL HPI/OVERNIGHT EVENTS:  Pt is seen and examined.  c/o headache, getting better. pain is controlled.    Pain Location & Control:     MEDICATIONS  (STANDING):  pantoprazole    Tablet 40milliGRAM(s) Oral before breakfast  senna 2Tablet(s) Oral at bedtime  docusate sodium 100milliGRAM(s) Oral three times a day  aspirin enteric coated 162milliGRAM(s) Oral every 12 hours  celecoxib 200milliGRAM(s) Oral two times a day after meals  atorvastatin 40milliGRAM(s) Oral daily  levothyroxine 100MICROGram(s) Oral daily  oxybutynin 5milliGRAM(s) Oral two times a day  acetaminophen   Tablet. 1000milliGRAM(s) Oral every 8 hours  aluminum hydroxide/magnesium hydroxide/simethicone Suspension 30milliLiter(s) Oral four times a day PRN Indigestion  ondansetron Injectable 4milliGRAM(s) IV Push every 6 hours PRN Nausea and/or Vomiting  polyethylene glycol 3350 17Gram(s) Oral daily PRN Constipation  bisacodyl Suppository 10milliGRAM(s) Rectal daily PRN Constipation  magnesium hydroxide Suspension 30milliLiter(s) Oral daily PRN Constipation  traMADol 50milliGRAM(s) Oral every 4 hours PRN Mild Pain  traMADol 100milliGRAM(s) Oral every 4 hours PRN Moderate Pain    Temp-98.9, HR-72/min, BP-105/71, RR-16.    Allergies    bacitracin topical (Other)  penicillins (Other)    Intolerances  WBC Count: 8.8 K/uL (06.24.17 @ 07:10)    Complete Blood Count in AM (06.24.17 @ 07:10)    WBC Count: 8.8 K/uL    RBC Count: 3.86 M/uL    Hemoglobin: 11.4 g/dL    Hematocrit: 33.7 %    Mean Cell Volume: 87.3 fl    Mean Cell Hemoglobin: 29.6 pg    Mean Cell Hemoglobin Conc: 34.0 gm/dL    Red Cell Distrib Width: 12.4 %    Platelet Count - Automated: 176 K/uL                  Imaging Personally Reviewed:  [ ] YES  [ ] NO    Consultant(s) Notes Reviewed:  [ x] YES  [ ] NO    Care Discussed with Consultants/Other Providers [ x] YES  [ ] NO

## 2017-06-25 NOTE — PROGRESS NOTE ADULT - PROBLEM SELECTOR PROBLEM 2
Primary osteoarthritis of right hip

## 2017-06-25 NOTE — PROGRESS NOTE ADULT - PROBLEM SELECTOR PLAN 5
stable, asymptomatic. cont oxybutynin.

## 2017-06-25 NOTE — PROGRESS NOTE ADULT - PROBLEM SELECTOR PLAN 1
stable, cont PT OT and pain control, DVT ppx with ASA and laxative.

## 2017-06-27 LAB — SURGICAL PATHOLOGY FINAL REPORT - CH: SIGNIFICANT CHANGE UP

## 2017-10-05 PROBLEM — Z00.00 ENCOUNTER FOR PREVENTIVE HEALTH EXAMINATION: Status: ACTIVE | Noted: 2017-10-05

## 2017-10-10 ENCOUNTER — APPOINTMENT (OUTPATIENT)
Dept: ORTHOPEDIC SURGERY | Facility: CLINIC | Age: 62
End: 2017-10-10
Payer: COMMERCIAL

## 2017-10-10 DIAGNOSIS — Z80.9 FAMILY HISTORY OF MALIGNANT NEOPLASM, UNSPECIFIED: ICD-10-CM

## 2017-10-10 DIAGNOSIS — Z87.891 PERSONAL HISTORY OF NICOTINE DEPENDENCE: ICD-10-CM

## 2017-10-10 DIAGNOSIS — Z78.9 OTHER SPECIFIED HEALTH STATUS: ICD-10-CM

## 2017-10-10 PROCEDURE — 73502 X-RAY EXAM HIP UNI 2-3 VIEWS: CPT | Mod: RT

## 2017-10-10 PROCEDURE — 99204 OFFICE O/P NEW MOD 45 MIN: CPT

## 2018-04-12 DIAGNOSIS — K22.70 BARRETT'S ESOPHAGUS W/OUT DYSPLASIA: ICD-10-CM

## 2018-04-18 RX ORDER — CIPROFLOXACIN HYDROCHLORIDE 500 MG/1
500 TABLET, FILM COATED ORAL
Qty: 10 | Refills: 0 | Status: ACTIVE | COMMUNITY
Start: 2018-04-18 | End: 1900-01-01

## 2018-04-20 NOTE — PHYSICAL THERAPY INITIAL EVALUATION ADULT - LEVEL OF INDEPENDENCE: SIT/STAND, REHAB EVAL
report palpitations after drinking alcohol last night and then taking 2 diet pills this morning, sates she vomited this morning too and had difficulty breathing minimum assist (75% patients effort)

## 2018-05-21 ENCOUNTER — OUTPATIENT (OUTPATIENT)
Dept: OUTPATIENT SERVICES | Facility: HOSPITAL | Age: 63
LOS: 1 days | End: 2018-05-21
Payer: COMMERCIAL

## 2018-05-21 ENCOUNTER — RESULT REVIEW (OUTPATIENT)
Age: 63
End: 2018-05-21

## 2018-05-21 ENCOUNTER — APPOINTMENT (OUTPATIENT)
Dept: GASTROENTEROLOGY | Facility: HOSPITAL | Age: 63
End: 2018-05-21

## 2018-05-21 DIAGNOSIS — Z98.890 OTHER SPECIFIED POSTPROCEDURAL STATES: Chronic | ICD-10-CM

## 2018-05-21 DIAGNOSIS — E89.0 POSTPROCEDURAL HYPOTHYROIDISM: Chronic | ICD-10-CM

## 2018-05-21 DIAGNOSIS — K22.70 BARRETT'S ESOPHAGUS WITHOUT DYSPLASIA: ICD-10-CM

## 2018-05-21 PROCEDURE — 43270 EGD LESION ABLATION: CPT

## 2018-05-21 PROCEDURE — 88312 SPECIAL STAINS GROUP 1: CPT

## 2018-05-21 PROCEDURE — 43239 EGD BIOPSY SINGLE/MULTIPLE: CPT | Mod: XU

## 2018-05-21 PROCEDURE — 88312 SPECIAL STAINS GROUP 1: CPT | Mod: 26

## 2018-05-21 PROCEDURE — 43252 EGD OPTICAL ENDOMICROSCOPY: CPT | Mod: GC

## 2018-05-21 PROCEDURE — 43239 EGD BIOPSY SINGLE/MULTIPLE: CPT | Mod: 59,GC

## 2018-05-21 PROCEDURE — 88305 TISSUE EXAM BY PATHOLOGIST: CPT

## 2018-05-21 PROCEDURE — 88305 TISSUE EXAM BY PATHOLOGIST: CPT | Mod: 26

## 2018-06-08 ENCOUNTER — CHART COPY (OUTPATIENT)
Age: 63
End: 2018-06-08

## 2018-07-16 PROBLEM — C67.9 MALIGNANT NEOPLASM OF BLADDER, UNSPECIFIED: Chronic | Status: ACTIVE | Noted: 2017-06-13

## 2018-07-23 PROBLEM — Z78.9 ALCOHOL USE: Status: ACTIVE | Noted: 2017-10-10

## 2019-01-03 NOTE — PATIENT PROFILE ADULT. - FUNCTIONAL SCREEN CURRENT LEVEL: TRANSFERRING, MLM
GENERAL: wells appearing, no acute distress   HEAD: +L periorbital swelling and ecchymosis   EYES: EOMI, pink conjunctiva, full visual fields, visual acuity intact  ENT: moist oral mucosa   CARDIAC: RRR, no edema, distal pulses present   RESPIRATORY: lungs CTAB, no increased work of breathing   GASTROINTESTINAL: no abdominal tenderness, no rebound or guarding, bowel sounds presents  GENITOURINARY: no CVA tenderness   MUSCULOSKELETAL: no deformity   NEUROLOGICAL: AAOx3, CN's II-XII intact, strength 5/5 bilateral UE and LE, sensation intact to light touch, finger to nose intact, steady gait  SKIN: intact   PSYCHIATRIC: cooperative  HEME LYMPH: no lymphadenopathy
(0) independent

## 2019-05-23 ENCOUNTER — OTHER (OUTPATIENT)
Age: 64
End: 2019-05-23

## 2020-01-10 PROBLEM — M19.90 UNSPECIFIED OSTEOARTHRITIS, UNSPECIFIED SITE: Chronic | Status: ACTIVE | Noted: 2017-06-13

## 2020-01-10 PROBLEM — N39.3 STRESS INCONTINENCE (FEMALE) (MALE): Chronic | Status: ACTIVE | Noted: 2017-06-13

## 2020-01-10 PROBLEM — E03.9 HYPOTHYROIDISM, UNSPECIFIED: Chronic | Status: ACTIVE | Noted: 2017-06-13

## 2020-01-13 ENCOUNTER — APPOINTMENT (OUTPATIENT)
Dept: ORTHOPEDIC SURGERY | Facility: CLINIC | Age: 65
End: 2020-01-13
Payer: COMMERCIAL

## 2020-01-13 DIAGNOSIS — M70.71 OTHER BURSITIS OF HIP, RIGHT HIP: ICD-10-CM

## 2020-01-13 DIAGNOSIS — Z96.641 PRESENCE OF RIGHT ARTIFICIAL HIP JOINT: ICD-10-CM

## 2020-01-13 PROCEDURE — 73502 X-RAY EXAM HIP UNI 2-3 VIEWS: CPT | Mod: RT

## 2020-01-13 PROCEDURE — 72170 X-RAY EXAM OF PELVIS: CPT | Mod: RT

## 2020-01-13 PROCEDURE — 99213 OFFICE O/P EST LOW 20 MIN: CPT

## 2020-01-13 NOTE — PHYSICAL EXAM
[de-identified] : AP Pelvis and AP/Lateral views of the right hip demonstrate post op right total hip replacement components and good alignment.  [de-identified] : Well-nourished, in no acute distress\par Alert and oriented to time, place and person\par Skin: no lesions discoloration\par Respirations: unlabored\par Cardiac: no leg swelling\par Lymphatic: no groin adenopathy \par R hip exam: Leg length is equal. Flexion: 90, internal rotation: 15, abduction: 45, adduction: 30 degrees. Satisfactory gait.RESISTED HIP FLEXION PROVOKES TYPICAL GROIN PAIN

## 2020-01-13 NOTE — DISCUSSION/SUMMARY
[de-identified] : Patient presents with right groin pain. We discussed at length the nature of the patient's condition. \par \par Impression: RIGHT ILIOPSOAS STRAIN RELATED TO ???COUGHING\par \par Plan: Based upon pt's statement of resolving symptoms, pt may follow up here on a PRN basis if signs and symptoms persist/worsen.

## 2020-01-13 NOTE — HISTORY OF PRESENT ILLNESS
[de-identified] : 64 year old female presents forright hip pain. Pt is s/p right total hip replacement 2 years ago and reports she was doing well until September when developed asthmatic bronchitis and pneumonia for which pt was hospitalized for around Thanksgiving for 1 week. Since then, pt has been experiencing pain by right groin. Pain is intermittent and provoked by inconsistently bending over to  object, sitting in a chair and different movements when walking. Pt denies back pain and LE numbness. Pt does experience cramping in both legs. Pt notes symptoms severity has been deceasing over the past 2 weeks.

## 2020-01-13 NOTE — ADDENDUM
[FreeTextEntry1] : This note was written by Leidy Montano on 01/13/2020 acting solely as a scribe for Dr. Saleem Alaniz.\par \par All medical record entries made by the Scribe were at my, Dr. Saleem Alaniz, direction and personally dictated by me on 01/13/2020. I have personally reviewed the chart and agree that the record accurately reflects my personal performance of the history, physical exam, assessment and plan.

## 2020-03-26 NOTE — H&P PST ADULT - BP NONINVASIVE DIASTOLIC (MM HG)
If the cream is causing burning pain, okay to stop that for now.  Patient to take a Tylenol 650 mg 4 times a day as needed  Okay to take tramadol up to 3 times a day if needed but try to limit the use because of his age.  Keep appointment with wound care.   80

## 2020-04-08 NOTE — CONSULT NOTE ADULT - PROBLEM SELECTOR PROBLEM 2
PA Initiation    Medication: elexacaftor-tezacaftor-ivacaftor & ivacaftor (TRIKAFTA) 100-50-75 & 150 MG tablet pack - pa pending  Insurance Company: [employer] - Phone [phone] Fax [phone]  Pharmacy Filling the Rx: [facility] - [place], [place] - [address]  Filling Pharmacy Phone: [phone]  Filling Pharmacy Fax: [phone]  Start Date: 4/8/2020       Primary osteoarthritis of right hip

## 2020-04-12 NOTE — OCCUPATIONAL THERAPY INITIAL EVALUATION ADULT - LEVEL OF INDEPENDENCE, OT EVAL
"   04/11/20 2114   Child Life   Location ED   Intervention Initial Assessment;Supportive Check In;Therapeutic Intervention;Preparation   Anxiety Appropriate   Techniques to [place] with Loss/Stress/Change diversional activity;family presence   Able to Shift Focus From Anxiety Easy   Outcomes/Follow Up Continue to Follow/Support     CCLS introduced self and services to pt and pt's family at bedside in ED. Pt appeared alert and was moderately sociable with CCLS during interaction. CCLS and pt debriefed pt's medical experiences thus far and pt displayed a developmentally appropriate level of understanding and coping regarding plan of care. Pt said that lab draw process \"was easy.\" CCLS provided procedural preparation for MRI. Later, CCLS discussed pt's transfer to other hospital facility, and pt said that she felt \"good\" about going to other hospital. Pt did not engage much in therapeutic debrief beyond that comment. Pt denied having further needs for normalization activities and did not have questions or concerns at this time. CCLS will continue to follow pt and family as needed.    Nika Church MS, CCLS  " moderate assist (50% patients effort)

## 2020-05-27 NOTE — OCCUPATIONAL THERAPY INITIAL EVALUATION ADULT - NS ASR FOLLOW COMMAND OT EVAL
Care Everywhere: n/a  Immunization: updated  Health Maintenance: updated  Media Review: reviewed for possible outside colon cancer and eye exam report  Legacy Review: n/a  Order placed: n/a  Upcoming appts:n/a        
100% of the time

## 2022-03-14 NOTE — PROGRESS NOTE ADULT - PROBLEM SELECTOR PROBLEM 6
Osteoarthritis
Xolair Counseling:  Patient informed of potential adverse effects including but not limited to fever, muscle aches, rash and allergic reactions.  The patient verbalized understanding of the proper use and possible adverse effects of Xolair.  All of the patient's questions and concerns were addressed.

## 2022-04-28 NOTE — DISCHARGE NOTE ADULT - THE PATIENT HAS
[facility] Division of Hospital Medicine  Abdirashid Aranda MD  Pager (M-F, 8A-5P): [phone]  Other Times:  [phone]    Patient is a 89y old  Female who presents with a chief complaint of S/P fall one week ago with persistent RIGHT hip pain (28 Apr 2022 11:26)    SUBJECTIVE / OVERNIGHT EVENTS: no complaints. son at bedside  ADDITIONAL REVIEW OF SYSTEMS:    MEDICATIONS  (STANDING):  acetaminophen     Tablet .. 975 milliGRAM(s) Oral every 8 hours  chlorhexidine 2% Cloths 1 Application(s) Topical daily  cholecalciferol 1000 Unit(s) Oral daily  cyanocobalamin 1000 MICROGram(s) Oral daily  enoxaparin Injectable 30 milliGRAM(s) SubCutaneous every 24 hours  losartan 25 milliGRAM(s) Oral daily  polyethylene glycol 3350 17 Gram(s) Oral daily  senna 2 Tablet(s) Oral at bedtime    MEDICATIONS  (PRN):  acetaminophen     Tablet .. 650 milliGRAM(s) Oral every 6 hours PRN Temp greater or equal to 38C (100.4F), Mild Pain (1 - 3)  bisacodyl Suppository 10 milliGRAM(s) Rectal daily PRN If no bowel movement  magnesium hydroxide Suspension 30 milliLiter(s) Oral daily PRN Constipation  oxyCODONE    IR 2.5 milliGRAM(s) Oral every 4 hours PRN Moderate Pain (4 - 6)  oxyCODONE    IR 5 milliGRAM(s) Oral every 4 hours PRN Severe Pain (7 - 10)      CAPILLARY BLOOD GLUCOSE        I&O's Summary    27 Apr 2022 07:01  -  28 Apr 2022 07:00  --------------------------------------------------------  IN: 950 mL / OUT: 500 mL / NET: 450 mL        PHYSICAL EXAM:  Vital Signs Last 24 Hrs  T(C): 36.4 (28 Apr 2022 08:27), Max: 37.1 (28 Apr 2022 01:14)  T(F): 97.5 (28 Apr 2022 08:27), Max: 98.8 (28 Apr 2022 01:14)  HR: 66 (28 Apr 2022 08:27) (62 - 79)  BP: 140/69 (28 Apr 2022 08:27) (118/60 - 159/66)  BP(mean): --  RR: 18 (28 Apr 2022 08:27) (18 - 18)  SpO2: 99% (28 Apr 2022 08:27) (95% - 99%)  CONSTITUTIONAL: NAD, well-developed, well-groomed  ENMT: Moist oral mucosa, no pharyngeal injection or exudates; normal dentition  RESPIRATORY: Normal respiratory effort; lungs are clear to auscultation bilaterally  CARDIOVASCULAR: Regular rate and rhythm, normal S1 and S2, no murmur/rub/gallop; No lower extremity edema; Peripheral pulses are 2+ bilaterally  ABDOMEN: Nontender to palpation, normoactive bowel sounds, no rebound/guarding; No hepatosplenomegaly  MUSCLOSKELETAL:  +R hip dressings clean  PSYCH: A+O to person, place, and time; affect appropriate    LABS:                        9.5    8.12  )-----------( 255      ( 28 Apr 2022 06:21 )             29.6     04-28    137  |  105  |  31<H>  ----------------------------<  89  4.8   |  21<L>  |  1.13    Ca    8.8      28 Apr 2022 06:21                  RADIOLOGY & ADDITIONAL TESTS:  Results Reviewed:   Imaging Personally Reviewed:  Electrocardiogram Personally Reviewed:    COORDINATION OF CARE:  Care Discussed with Consultants/Other Providers [Y/N]:  Prior or Outpatient Records Reviewed [Y/N]:   no difficulties

## 2023-04-10 ENCOUNTER — APPOINTMENT (OUTPATIENT)
Dept: ORTHOPEDIC SURGERY | Facility: CLINIC | Age: 68
End: 2023-04-10

## 2023-04-10 ENCOUNTER — APPOINTMENT (OUTPATIENT)
Dept: ORTHOPEDIC SURGERY | Facility: CLINIC | Age: 68
End: 2023-04-10
Payer: COMMERCIAL

## 2023-04-10 DIAGNOSIS — Z96.641 PRESENCE OF RIGHT ARTIFICIAL HIP JOINT: ICD-10-CM

## 2023-04-10 PROCEDURE — 73502 X-RAY EXAM HIP UNI 2-3 VIEWS: CPT

## 2023-04-10 PROCEDURE — 99213 OFFICE O/P EST LOW 20 MIN: CPT

## 2023-04-10 NOTE — HISTORY OF PRESENT ILLNESS
[de-identified] : 67-year-old female status post right total hip replacement June 22, 2017.  Hip implant part of [employer] recall.  Denies groin or thigh pain.  Ambulating independently and symptom-free.

## 2023-04-10 NOTE — PHYSICAL EXAM
[de-identified] : Well-nourished, in no acute distress\par Alert and oriented to time, place and person\par Skin: no lesions discoloration\par Respirations: unlabored\par Cardiac: no leg swelling\par Lymphatic: no groin adenopathy \par R hip exam: Leg length is equal. Flexion: 90, internal rotation: 15, abduction: 45, adduction: 30 degrees. Satisfactory gait.RESISTED HIP FLEXION 5/5 pain-free resisted hip abduction 5/5 pain-free [de-identified] : X-ray AP pelvis right hip AP lateral demonstrate component alignment is maintained there is minimal resorption underneath called her femoral component

## 2023-08-22 NOTE — DISCHARGE NOTE ADULT - ADMISSION DATE +STARTOFVISITDATE
Show Applicator Variable?: Yes
Duration Of Freeze Thaw-Cycle (Seconds): 0
Statement Selected
Post-Care Instructions: I reviewed with the patient in detail post-care instructions. Patient is to wear sunprotection, and avoid picking at any of the treated lesions. Pt may apply Vaseline to crusted or scabbing areas.
Detail Level: Detailed
Application Tool (Optional): Liquid Nitrogen Sprayer
Consent: The patient's consent was obtained including but not limited to risks of crusting, scabbing, blistering, scarring, darker or lighter pigmentary change, recurrence, incomplete removal and infection.
Render Note In Bullet Format When Appropriate: No
Number Of Freeze-Thaw Cycles: 1 freeze-thaw cycle
no

## 2024-04-24 NOTE — DISCHARGE NOTE ADULT - NS AS DC FOLLOWUP INST YN
Per mother, pt has been congested for 2 weeks. Pt mother brought pt in to get suctioned. Denies any other questions or concerns. States pt has normal activity, normal PO, and normal breathing.   No